# Patient Record
Sex: FEMALE
[De-identification: names, ages, dates, MRNs, and addresses within clinical notes are randomized per-mention and may not be internally consistent; named-entity substitution may affect disease eponyms.]

---

## 2020-01-03 ENCOUNTER — HOSPITAL ENCOUNTER (EMERGENCY)
Dept: HOSPITAL 97 - ER | Age: 30
Discharge: HOME | End: 2020-01-03
Payer: COMMERCIAL

## 2020-01-03 VITALS — TEMPERATURE: 97.5 F

## 2020-01-03 VITALS — DIASTOLIC BLOOD PRESSURE: 83 MMHG | SYSTOLIC BLOOD PRESSURE: 159 MMHG | OXYGEN SATURATION: 100 %

## 2020-01-03 DIAGNOSIS — E11.65: Primary | ICD-10-CM

## 2020-01-03 LAB
ALBUMIN SERPL BCP-MCNC: 3.3 G/DL (ref 3.4–5)
ALP SERPL-CCNC: 119 U/L (ref 45–117)
ALT SERPL W P-5'-P-CCNC: 28 U/L (ref 12–78)
AST SERPL W P-5'-P-CCNC: 15 U/L (ref 15–37)
BUN BLD-MCNC: 13 MG/DL (ref 7–18)
GLUCOSE SERPLBLD-MCNC: 303 MG/DL (ref 74–106)
HCT VFR BLD CALC: 41.7 % (ref 36–45)
LIPASE SERPL-CCNC: 168 U/L (ref 73–393)
LYMPHOCYTES # SPEC AUTO: 1.7 K/UL (ref 0.7–4.9)
PMV BLD: 9.3 FL (ref 7.6–11.3)
POTASSIUM SERPL-SCNC: 4.1 MMOL/L (ref 3.5–5.1)
RBC # BLD: 5.05 M/UL (ref 3.86–4.86)

## 2020-01-03 PROCEDURE — 81025 URINE PREGNANCY TEST: CPT

## 2020-01-03 PROCEDURE — 83690 ASSAY OF LIPASE: CPT

## 2020-01-03 PROCEDURE — 85025 COMPLETE CBC W/AUTO DIFF WBC: CPT

## 2020-01-03 PROCEDURE — 81003 URINALYSIS AUTO W/O SCOPE: CPT

## 2020-01-03 PROCEDURE — 36415 COLL VENOUS BLD VENIPUNCTURE: CPT

## 2020-01-03 PROCEDURE — 99284 EMERGENCY DEPT VISIT MOD MDM: CPT

## 2020-01-03 PROCEDURE — 96361 HYDRATE IV INFUSION ADD-ON: CPT

## 2020-01-03 PROCEDURE — 82947 ASSAY GLUCOSE BLOOD QUANT: CPT

## 2020-01-03 PROCEDURE — 96374 THER/PROPH/DIAG INJ IV PUSH: CPT

## 2020-01-03 PROCEDURE — 80048 BASIC METABOLIC PNL TOTAL CA: CPT

## 2020-01-03 PROCEDURE — 80076 HEPATIC FUNCTION PANEL: CPT

## 2020-01-03 PROCEDURE — 96375 TX/PRO/DX INJ NEW DRUG ADDON: CPT

## 2020-01-03 PROCEDURE — 82010 KETONE BODYS QUAN: CPT

## 2020-01-03 NOTE — ER
Nurse's Notes                                                                                     

 [facility]                                                                 

Name: Laura Hall                                                                                

Age: 29 yrs                                                                                       

Sex: Female                                                                                       

: 1990                                                                                   

MRN: [id number]                                                                                   

Arrival Date: 2020                                                                          

Time: 16:24                                                                                       

Account#: [id number]                                                                            

Bed 23                                                                                            

Private MD:                                                                                       

Diagnosis: Nausea and vomiting;Diabetes mellitus due to underlying condition with hyperglycemia   

                                                                                                  

Presentation:                                                                                     

                                                                                             

16:35 Presenting complaint: Patient states: vomiting since this morning. Pt reports she is    ss  

      diabetic, but has not checked her sugar and/or taken medication in a while. Pt is           

      unsure whether or not it is due to her bgl or if she has a stomach virus. Transition of     

      care: patient was not received from another setting of care. Onset of symptoms was          

      2020. Risk Assessment: Do you want to hurt yourself or someone else?            

      Patient reports no desire to harm self or others. Initial Sepsis Screen: Does the           

      patient meet any 2 criteria? No. Patient's initial sepsis screen is negative. Does the      

      patient have a suspected source of infection? No. Patient's initial sepsis screen is        

      negative. Care prior to arrival: None.                                                      

16:35 Method Of Arrival: Ambulatory                                                           ss  

16:35 Acuity: HIEU 3                                                                           ss  

                                                                                                  

OB/GYN:                                                                                           

17:06 LMP 2019                                                                           ca1 

                                                                                                  

Historical:                                                                                       

- Allergies:                                                                                      

16:37 No Known Allergies;                                                                     ss  

- Home Meds:                                                                                      

16:37 None [Active];                                                                          ss  

- PMHx:                                                                                           

16:37 Diabetes - NIDDM;                                                                       ss  

- PSHx:                                                                                           

16:37 ;                                                                              ss  

                                                                                                  

- Immunization history:: Adult Immunizations up to date.                                          

- Social history:: Smoking status: Patient/guardian denies using tobacco.                         

- Ebola Screening: : Patient denies exposure to infectious person Patient denies travel           

  to an Ebola-affected area in the 21 days before illness onset.                                  

                                                                                                  

                                                                                                  

Screenin:20 Abuse screen: Denies threats or abuse. Nutritional screening: No deficits noted.        em  

      Tuberculosis screening: No symptoms or risk factors identified. Fall Risk None              

      identified.                                                                                 

                                                                                                  

Assessment:                                                                                       

17:20 General: Appears in no apparent distress. comfortable, Behavior is calm, cooperative,   em  

      Denies fever. Pain: Denies pain. Neuro: Level of Consciousness is awake, alert, obeys       

      commands, Oriented to person, place, time, situation, Appropriate for age.                  

      Cardiovascular: Capillary refill < 3 seconds Patient's skin is warm and dry.                

      Respiratory: Airway is patent Respiratory effort is even, unlabored, Respiratory            

      pattern is regular, symmetrical. GI: Abdomen is flat, Reports nausea, Patient currently     

      denies vomiting. Derm: Skin is intact, is healthy with good turgor, Skin is pink, warm      

      \T\ dry. Musculoskeletal: Capillary refill < 3 seconds, Range of motion: intact in all      

      extremities.                                                                                

18:10 Reassessment: Patient appears in no apparent distress at this time. Patient and/or      em  

      family updated on plan of care and expected duration. Pain level reassessed. Patient is     

      alert, oriented x 3, equal unlabored respirations, skin warm/dry/pink. Patient denies       

      pain at this time. Patient states feeling better. Patient states symptoms have improved.    

18:15 Reassessment: Patient appears in no apparent distress at this time. Patient and/or      em  

      family updated on plan of care and expected duration. Pain level reassessed. Patient is     

      alert, oriented x 3, equal unlabored respirations, skin warm/dry/pink. given water for      

      PO challenge, tolerated well.                                                               

                                                                                                  

Vital Signs:                                                                                      

16:37  / 90; Pulse 109; Resp 15; Temp 97.5(TE); Pulse Ox 100% on R/A; Weight 88.45 kg;  ss  

      Height 5 ft. 4 in. (162.56 cm); Pain 5/10;                                                  

16:57                                                                                         jp3 

17:26  / 79; Pulse 99; Resp 18; Pulse Ox 99% on R/A; Pain 0/10;                         em  

18:16  / 83; Pulse 100; Resp 16 S; Pulse Ox 100% on R/A;                                ca1 

16:37 Body Mass Index 33.47 (88.45 kg, 162.56 cm)                                             ss  

16:57 patient blood sugar at bedside was 298                                                  jp3 

                                                                                                  

ED Course:                                                                                        

16:24 Patient arrived in ED.                                                                  rg4 

16:36 Triage completed.                                                                       ss  

16:37 Arm band placed on left wrist.                                                          ss  

16:41 Abdirizak Romero LVN is Primary Nurse.                                                     em  

16:50 Inserted saline lock: 20 gauge in right antecubital area, using aseptic technique.      jp3 

      Blood collected.                                                                            

16:50 Initial lab(s) drawn, by me, held in ED.                                                jp3 

16:53 Danny Hoffmann PA is PHCP.                                                                cp  

16:53 Anton Ibarra MD is Attending Physician.                                              cp  

16:56 Bed in low position. Call light in reach. Side rails up X 1. Verbal reassurance given.  jp3 

      Pulse ox on. NIBP on.                                                                       

16:56 Patient maintains SpO2 saturation greater than 95% on room air.                         jp3 

17:07 Initial lab(s) drawn, by me, sent to lab.                                               jp3 

18:57 No provider procedures requiring assistance completed. IV discontinued, intact,         em  

      bleeding controlled, No redness/swelling at site. Pressure dressing applied.                

                                                                                                  

Administered Medications:                                                                         

17:05 Drug: Pepcid 20 mg Route: IVP; Site: right antecubital;                                 ca1 

18:18 Follow up: Response: No adverse reaction; Marked relief of symptoms                     em  

17:05 Drug: NS 0.9% 1000 ml Route: IV; Rate: 1 bolus; Site: right antecubital;                ca1 

18:18 Follow up: IV Status: Completed infusion; IV Intake: 1000ml                             em  

17:10 Drug: Zofran 4 mg Route: IVP; Site: right antecubital;                                  ca1 

18:19 Follow up: Response: No adverse reaction; Marked relief of symptoms; Nausea is decreasedem  

                                                                                                  

                                                                                                  

Intake:                                                                                           

18:18 IV: 1000ml; Total: 1000ml.                                                              em  

                                                                                                  

Outcome:                                                                                          

18:40 Discharge ordered by MD.                                                                cp  

18:58 Discharged to home ambulatory.                                                          em  

18:58 Condition: good                                                                             

18:58 Discharge instructions given to patient, Instructed on discharge instructions, follow       

      up and referral plans. medication usage, Demonstrated understanding of instructions,        

      follow-up care, medications, Prescriptions given X 2.                                       

18:58 Patient left the ED.                                                                    em  

                                                                                                  

Signatures:                                                                                       

Abdirizak Romero LVN LVN  em                                                   

Radha Delacruz, RN                      RN   Danny Monroe PA                         PA   Salena Panda Jacob                              jp3                                                  

Jane Long, JOLIE                        RN   ca1                                                  

                                                                                                  

**************************************************************************************************

## 2020-01-03 NOTE — EDPHYS
Physician Documentation                                                                           

 Former[facility]                                                                 

Name: Laura Hall                                                                                

Age: 29 yrs                                                                                       

Sex: Female                                                                                       

: 1990                                                                                   

MRN: [id number]                                                                                   

Arrival Date: 2020                                                                          

Time: 16:24                                                                                       

Account#: [id number]                                                                            

Bed 23                                                                                            

Private MD:                                                                                       

ED Physician Anton Ibarra                                                                       

HPI:                                                                                              

                                                                                             

17:00 This 29 yrs old  Female presents to ER via Ambulatory with complaints of        cp  

      Vomiting.                                                                                   

                                                                                                  

OB/GYN:                                                                                           

17:06 LMP 2019                                                                           ca1 

                                                                                                  

Historical:                                                                                       

- Allergies:                                                                                      

16:37 No Known Allergies;                                                                     ss  

- Home Meds:                                                                                      

16:37 None [Active];                                                                          ss  

- PMHx:                                                                                           

16:37 Diabetes - NIDDM;                                                                       ss  

- PSHx:                                                                                           

16:37 ;                                                                              ss  

                                                                                                  

- Immunization history:: Adult Immunizations up to date.                                          

- Social history:: Smoking status: Patient/guardian denies using tobacco.                         

- Ebola Screening: : Patient denies exposure to infectious person Patient denies travel           

  to an Ebola-affected area in the 21 days before illness onset.                                  

                                                                                                  

                                                                                                  

ROS:                                                                                              

17:05 Constitutional: Negative for body aches, chills, fever.                                 cp  

17:05 Eyes: Negative for injury, pain, redness, and discharge.                                cp  

17:05 ENT: Negative for drainage from ear(s), ear pain, sore throat, difficulty swallowing,       

      difficulty handling secretions.                                                             

17:05 Cardiovascular: Negative for chest pain.                                                    

17:05 Respiratory: Negative for cough, shortness of breath, wheezing.                             

17:05 Abdomen/GI: Positive for nausea and vomiting, Negative for abdominal pain, diarrhea,        

      constipation, anorexia.                                                                     

17:05 Back: Negative for pain at rest, pain with movement, radiated pain.                         

17:05 : Negative for urinary symptoms, vaginal bleeding, vaginal discharge.                     

17:05 Skin: Negative for rash.                                                                    

17:05 Neuro: Negative for altered mental status, headache, weakness.                              

17:05 All other systems are negative.                                                             

                                                                                                  

Exam:                                                                                             

17:20 Constitutional: The patient appears in no acute distress, alert, awake, comfortable,    cp  

      non-toxic, well developed, well nourished.                                                  

17:20 Head/Face:  Normocephalic, atraumatic.                                                  cp  

17:20 Eyes: Periorbital structures: appear normal, Conjunctiva: normal, no exudate, no        cp  

      injection, Sclera: no appreciated abnormality, Lids and lashes: appear normal,              

      bilaterally.                                                                                

17:20 ENT: External ear(s): are unremarkable, Nose: is normal, Mouth: Lips: moist, Oral           

      mucosa: pink and intact, moist, Posterior pharynx: is normal, airway is patent, no          

      erythema, no exudate, Tonsils: are normal in appearance.                                    

17:20 Neck: ROM/movement: Meningeal signs: are not present, nuchal rigidity, is not               

      appreciated.                                                                                

17:20 Chest/axilla: Inspection: normal, Palpation: is normal, no crepitus, no tenderness.         

17:20 Cardiovascular: Rate: tachycardic, Rhythm: regular.                                         

17:20 Respiratory: the patient does not display signs of respiratory distress,  Respirations:     

      normal, no use of accessory muscles, no retractions, labored breathing, is not present,     

      Breath sounds: are clear throughout, no decreased breath sounds, no stridor, no             

      wheezing.                                                                                   

17:20 Abdomen/GI: Inspection: abdomen appears normal, Bowel sounds: active, all quadrants,        

      Palpation: abdomen is soft and non-tender, in all quadrants, rebound tenderness, is not     

      appreciated, voluntary guarding, is not appreciated, involuntary guarding, is not           

      appreciated.                                                                                

17:20 Back: pain, is absent.                                                                      

17:20 Skin: no rash present.                                                                      

17:20 Neuro: Orientation: to person, place \T\ time. Mentation: is normal, Motor: moves all       

      fours, strength is normal.                                                                  

                                                                                                  

Vital Signs:                                                                                      

16:37  / 90; Pulse 109; Resp 15; Temp 97.5(TE); Pulse Ox 100% on R/A; Weight 88.45 kg;  ss  

      Height 5 ft. 4 in. (162.56 cm); Pain 5/10;                                                  

16:57                                                                                         jp3 

17:26  / 79; Pulse 99; Resp 18; Pulse Ox 99% on R/A; Pain 0/10;                         em  

18:16  / 83; Pulse 100; Resp 16 S; Pulse Ox 100% on R/A;                                ca1 

16:37 Body Mass Index 33.47 (88.45 kg, 162.56 cm)                                             ss  

16:57 patient blood sugar at bedside was 298                                                  jp3 

                                                                                                  

MDM:                                                                                              

16:54 Patient medically screened.                                                             cp  

18:36 Data reviewed: vital signs, nurses notes, lab test result(s), and as a result, I will   cp  

      discharge patient. ED course: VSS. Nausea improved and no episodes of vomiting observed     

      while in ED. Patient denied any abdominal pain initially and continues to deny              

      abdominal pain. Will not perform any radiographic imaging at this time, will refill         

      Metformin 1000mg twice daily as patient has been out of medication since recent move to     

      [facility] from [place] and give follow-up info for primary physician.                       

                                                                                                  

                                                                                             

17:01 Order name: Basic Metabolic Panel; Complete Time: 18:24                                   

                                                                                             

18:24 Interpretation: Normal except: GLUC 303.                                                  

                                                                                             

17:01 Order name: CBC with Diff; Complete Time: 18:24                                           

                                                                                             

18:25 Interpretation: Normal except: RBC 5.05; LUZ MARIA% 77.7.                                       

                                                                                             

17:01 Order name: Creatinine for Radiology; Complete Time: 18:24                              cp  

                                                                                             

17:01 Order name: Hepatic Function; Complete Time: 18:24                                        

                                                                                             

17:01 Order name: Lipase; Complete Time: 18:24                                                  

                                                                                             

17:02 Order name: Glucose, Ancillary Testing                                                  [facility]

                                                                                             

17:20 Order name: Ketone, Serum; Complete Time: 18:24                                           

                                                                                             

17:25 Order name: Urine Dipstick--Ancillary (enter results); Complete Time: 18:24             [facility] 

                                                                                             

18:25 Interpretation: Normal except: UGLUC 2+; UKET 4+; UBLD 2+; UPROT 3+.                      

                                                                                             

17:25 Order name: Urine Pregnancy--Ancillary (enter results)                                  [facility] 

                                                                                             

18:41 Order name: Glucose, Ancillary Testing                                                  [facility]

                                                                                             

18:55 Order name: Glucose, Ancillary Testing                                                  [facility]

                                                                                             

16:53 Order name: Urine Dipstick-Ancillary (obtain specimen); Complete Time: 17:22            cp  

                                                                                             

16:53 Order name: Urine Pregnancy Test (obtain specimen); Complete Time: 17:22                cp  

                                                                                             

16:54 Order name: Accucheck Blood Glucose; Complete Time: 16:54                               cp  

                                                                                             

17:01 Order name: IV Saline Lock; Complete Time: 17:02                                        cp  

                                                                                             

17:01 Order name: Labs collected and sent; Complete Time: 17:02                               cp  

                                                                                             

18:25 Order name: PO challenge; Complete Time: 18:27                                          cp  

                                                                                                  

Administered Medications:                                                                         

17:05 Drug: Pepcid 20 mg Route: IVP; Site: right antecubital;                                 ca1 

18:18 Follow up: Response: No adverse reaction; Marked relief of symptoms                     em  

17:05 Drug: NS 0.9% 1000 ml Route: IV; Rate: 1 bolus; Site: right antecubital;                ca1 

18:18 Follow up: IV Status: Completed infusion; IV Intake: 1000ml                             em  

17:10 Drug: Zofran 4 mg Route: IVP; Site: right antecubital;                                  ca1 

18:19 Follow up: Response: No adverse reaction; Marked relief of symptoms; Nausea is decreasedem  

                                                                                                  

                                                                                                  

Disposition:                                                                                      

20 18:40 Discharged to Home. Impression: Nausea and vomiting, Diabetes mellitus due to      

  underlying condition with hyperglycemia.                                                        

- Condition is Stable.                                                                            

- Discharge Instructions: Nausea and Vomiting, Adult, Blood Glucose Monitoring, Adult,            

  Diabetes Mellitus and Food.                                                                     

- Prescriptions for metformin 1,000 mg Oral tablet - take 1 tablet by ORAL route 2                

  times per day; 60 tablet. Zofran 4 mg Oral Tablet - take 1 tablet by ORAL route every           

  12 hours As needed; 20 tablet.                                                                  

- Work release form, Medication Reconciliation Form, Thank You Letter, Antibiotic                 

  Education, Prescription Opioid Use form.                                                        

- Follow up: Private Physician; When: 1 week; Reason: Recheck today's complaints.                 

- Problem is new.                                                                                 

- Symptoms have improved.                                                                         

                                                                                                  

                                                                                                  

                                                                                                  

Addendum:                                                                                         

2020                                                                                        

     06:58 Co-signature as Attending Physician, Anton Ibarra MD I agree with the assessment and   k
dr

           plan of care.                                                                          

                                                                                                  

Signatures:                                                                                       

Dispatcher [employer]                           Anton Quintero MD MD   [facility]                                                  

Abdirizak Romero, LVN                       LVN  em                                                   

Radha Delacruz RN                      RN   Danny Monroe PA PA   cp                                                   

Jane Long, RN                        RN   ca1                                                  

                                                                                                  

Corrections: (The following items were deleted from the chart)                                    

                                                                                             

18:58 18:40 2020 18:40 Discharged to Home. Impression: Nausea and vomiting; Diabetes    em  

      mellitus due to underlying condition with hyperglycemia. Condition is Stable. Forms are     

      Medication Reconciliation Form, Thank You Letter, Antibiotic Education, Prescription        

      Opioid Use. Follow up: Private Physician; When: 1 week; Reason: Recheck today's             

      complaints. Problem is new. Symptoms have improved. cp                                      

                                                                                                  

**************************************************************************************************

## 2020-01-03 NOTE — XMS REPORT
[facility]

 Created on:2019



Patient:Laura Hall

Sex:Female

:1990

External Reference #:[id number]





Demographics







 Address  [address] [place] [id number]



   [place] [address]

 

 Phone  [phone]

 

 Preferred Language  en

 

 Marital Status  Unknown

 

 [employer] Affiliation  Unknown

 

 Race  Unknown

 

 Ethnic Group  Unknown









Author







 Organization  [employer]









Care Team Providers







 Name  Role  Phone

 

 Kole Solis  Provider Role  Unavailable









Allergies, Adverse Reactions, Alerts







 Substance  Reaction  Event Type

 

 N.K.D.A.  Info Not Available  Non Drug Allergy







Problems







 Problem Type  Condition  Code  Onset Dates  Condition Status

 

 Assessment  History of gestational hypertension  Z87.59    Active

 

 Problem  Type 2 diabetes mellitus with other  E11.69    Active



   specified complication, unspecified      



   whether long term insulin use      

 

 Problem  History of gestational diabetes  Z86.32    Active

 

 Assessment  Type 2 diabetes mellitus with other  E11.69    Active



   specified complication, unspecified      



   whether long term insulin use      

 

 Assessment  History of gestational diabetes  Z86.32    Active

 

 Assessment  Acute non-recurrent maxillary  J01.00    Active



   sinusitis      

 

 Assessment  Upper respiratory tract infection,  J06.9    Active



   unspecified type      







Medications







 Medication  Code  Code  Instructions  Start  End  Status  Dosage



   System      Date  Date    

 

 Amoxicillin-Pot  NDC  [id number]  875-125 MG  Oct 22,  Nov 01,  Active  1 
tablet



 Clavulanate      Orally every 12  2019    



       hrs        

 

 Xigduo XR  NDC  [id number]  5-1000 MG Orally  Oct 22,  Nov 21,  Active  1 
tablet



       Once a day  2019    







Results







 Name  Result  Date  Reference Range  Unit  Abnormality Flag

 

 STREP A RAPID          

 

 ----Result  NEGATIVE  2019      

 

 FLU TEST A/B          

 

 ----B  Neg  2019      

 

 ----A  Neg  2019      







Summary Purpose

[employer] Submission